# Patient Record
Sex: FEMALE | NOT HISPANIC OR LATINO | Employment: OTHER
[De-identification: names, ages, dates, MRNs, and addresses within clinical notes are randomized per-mention and may not be internally consistent; named-entity substitution may affect disease eponyms.]

---

## 2022-08-16 ENCOUNTER — EXTERNAL RECORD (OUTPATIENT)
Dept: HEALTH INFORMATION MANAGEMENT | Facility: OTHER | Age: 67
End: 2022-08-16

## 2022-12-14 ENCOUNTER — OFFICE VISIT (OUTPATIENT)
Dept: GASTROENTEROLOGY | Age: 67
End: 2022-12-14

## 2022-12-14 ENCOUNTER — TELEPHONE (OUTPATIENT)
Dept: GASTROENTEROLOGY | Age: 67
End: 2022-12-14

## 2022-12-14 VITALS
BODY MASS INDEX: 28.92 KG/M2 | HEART RATE: 97 BPM | WEIGHT: 169.4 LBS | HEIGHT: 64 IN | SYSTOLIC BLOOD PRESSURE: 108 MMHG | DIASTOLIC BLOOD PRESSURE: 70 MMHG

## 2022-12-14 DIAGNOSIS — Z90.410 H/O WHIPPLE PROCEDURE: ICD-10-CM

## 2022-12-14 DIAGNOSIS — Z90.49 H/O WHIPPLE PROCEDURE: ICD-10-CM

## 2022-12-14 DIAGNOSIS — R63.4 WEIGHT LOSS: Primary | ICD-10-CM

## 2022-12-14 PROCEDURE — 99204 OFFICE O/P NEW MOD 45 MIN: CPT | Performed by: INTERNAL MEDICINE

## 2022-12-14 RX ORDER — LORATADINE 10 MG/1
10 TABLET ORAL DAILY PRN
COMMUNITY

## 2022-12-14 RX ORDER — LEVOCETIRIZINE DIHYDROCHLORIDE 5 MG/1
TABLET, FILM COATED ORAL
COMMUNITY
Start: 2022-12-13

## 2022-12-14 RX ORDER — ATORVASTATIN CALCIUM 80 MG/1
TABLET, FILM COATED ORAL
COMMUNITY

## 2022-12-14 RX ORDER — FAMOTIDINE 20 MG/1
TABLET, FILM COATED ORAL
COMMUNITY

## 2022-12-14 RX ORDER — PANTOPRAZOLE SODIUM 40 MG/1
TABLET, DELAYED RELEASE ORAL
COMMUNITY
Start: 2022-12-13

## 2022-12-14 RX ORDER — CHLORAL HYDRATE 500 MG
1000 CAPSULE ORAL
COMMUNITY

## 2022-12-14 RX ORDER — GABAPENTIN 300 MG/1
CAPSULE ORAL
COMMUNITY

## 2022-12-14 RX ORDER — CITALOPRAM 20 MG/1
TABLET ORAL
COMMUNITY
Start: 2022-12-12

## 2022-12-14 RX ORDER — CLOPIDOGREL BISULFATE 75 MG/1
TABLET ORAL
COMMUNITY
Start: 2022-11-10

## 2022-12-14 RX ORDER — ALBUTEROL SULFATE 90 UG/1
1 AEROSOL, METERED RESPIRATORY (INHALATION)
COMMUNITY

## 2022-12-14 RX ORDER — MONTELUKAST SODIUM 10 MG/1
TABLET ORAL
COMMUNITY

## 2022-12-14 RX ORDER — ATORVASTATIN CALCIUM 80 MG/1
TABLET, FILM COATED ORAL
COMMUNITY
Start: 2022-12-12

## 2022-12-14 RX ORDER — EZETIMIBE 10 MG/1
TABLET ORAL
COMMUNITY
Start: 2022-12-13

## 2022-12-14 RX ORDER — POTASSIUM CHLORIDE 20 MEQ/1
TABLET, EXTENDED RELEASE ORAL
COMMUNITY
Start: 2022-12-12

## 2022-12-14 RX ORDER — FLUTICASONE FUROATE, UMECLIDINIUM BROMIDE AND VILANTEROL TRIFENATATE 200; 62.5; 25 UG/1; UG/1; UG/1
POWDER RESPIRATORY (INHALATION)
COMMUNITY
Start: 2022-09-16

## 2022-12-14 RX ORDER — MODAFINIL 200 MG/1
1.5 TABLET ORAL
COMMUNITY

## 2022-12-14 RX ORDER — FERROUS SULFATE 325(65) MG
TABLET ORAL
COMMUNITY

## 2022-12-14 RX ORDER — ARMODAFINIL 250 MG/1
TABLET ORAL
COMMUNITY
Start: 2022-11-04

## 2022-12-14 RX ORDER — INSULIN GLARGINE 100 [IU]/ML
INJECTION, SOLUTION SUBCUTANEOUS
COMMUNITY
Start: 2022-09-30

## 2022-12-14 RX ORDER — TIOTROPIUM BROMIDE 18 UG/1
CAPSULE ORAL; RESPIRATORY (INHALATION)
COMMUNITY

## 2022-12-14 ASSESSMENT — ENCOUNTER SYMPTOMS
WEAKNESS: 0
SORE THROAT: 0
SHORTNESS OF BREATH: 0
CONSTIPATION: 0
ABDOMINAL PAIN: 0
COUGH: 0
CHILLS: 0
EYE PAIN: 0
FEVER: 0
NAUSEA: 0
BLOOD IN STOOL: 0
WEIGHT LOSS: 1
HALLUCINATIONS: 0
DIARRHEA: 0
HEARTBURN: 0
HEADACHES: 0
DIZZINESS: 0
VOMITING: 0
MEMORY LOSS: 0
BACK PAIN: 0

## 2022-12-28 ENCOUNTER — HOSPITAL ENCOUNTER (OUTPATIENT)
Dept: GENERAL RADIOLOGY | Age: 67
End: 2022-12-28
Attending: STUDENT IN AN ORGANIZED HEALTH CARE EDUCATION/TRAINING PROGRAM

## 2023-03-24 ENCOUNTER — OFFICE VISIT (OUTPATIENT)
Dept: PODIATRY CLINIC | Facility: CLINIC | Age: 68
End: 2023-03-24
Payer: MEDICARE

## 2023-03-24 DIAGNOSIS — M79.675 GREAT TOE PAIN, LEFT: ICD-10-CM

## 2023-03-24 DIAGNOSIS — E11.42 TYPE 2 DIABETES MELLITUS WITH POLYNEUROPATHY (HCC): ICD-10-CM

## 2023-03-24 DIAGNOSIS — M79.674 GREAT TOE PAIN, RIGHT: ICD-10-CM

## 2023-03-24 DIAGNOSIS — I73.9 PERIPHERAL VASCULAR DISEASE (HCC): ICD-10-CM

## 2023-03-24 DIAGNOSIS — L60.0 INGROWN TOENAIL OF BOTH FEET: Primary | ICD-10-CM

## 2023-03-24 PROCEDURE — 1125F AMNT PAIN NOTED PAIN PRSNT: CPT | Performed by: PODIATRIST

## 2023-03-24 PROCEDURE — 99203 OFFICE O/P NEW LOW 30 MIN: CPT | Performed by: PODIATRIST

## 2023-03-24 RX ORDER — CITALOPRAM 20 MG/1
TABLET ORAL
COMMUNITY
Start: 2022-12-12

## 2023-03-24 RX ORDER — LEVOCETIRIZINE DIHYDROCHLORIDE 5 MG/1
TABLET, FILM COATED ORAL
COMMUNITY
Start: 2022-12-13

## 2023-03-24 RX ORDER — ASPIRIN 81 MG/1
81 TABLET ORAL DAILY
COMMUNITY

## 2023-03-24 RX ORDER — CLOPIDOGREL BISULFATE 75 MG/1
TABLET ORAL
COMMUNITY
Start: 2023-02-24

## 2023-03-24 RX ORDER — ARMODAFINIL 250 MG/1
TABLET ORAL
COMMUNITY
Start: 2022-11-04

## 2023-03-24 RX ORDER — PANTOPRAZOLE SODIUM 40 MG/1
TABLET, DELAYED RELEASE ORAL
COMMUNITY
Start: 2023-02-24

## 2023-03-24 RX ORDER — SITAGLIPTIN 100 MG/1
TABLET, FILM COATED ORAL
COMMUNITY
Start: 2023-02-24

## 2023-03-24 RX ORDER — ISOPROPYL ALCOHOL 70 ML/100ML
SWAB TOPICAL
COMMUNITY
Start: 2023-02-28

## 2023-03-24 RX ORDER — FAMOTIDINE 20 MG/1
TABLET, FILM COATED ORAL
COMMUNITY

## 2023-03-24 RX ORDER — MAGNESIUM OXIDE 400 MG (241.3 MG MAGNESIUM) TABLET
1 TABLET AS DIRECTED
COMMUNITY
Start: 2021-12-07

## 2023-03-24 RX ORDER — CALCIUM CITRATE/VITAMIN D3 200MG-6.25
TABLET ORAL
COMMUNITY
Start: 2023-02-23

## 2023-03-24 RX ORDER — LIDOCAINE HYDROCHLORIDE 20 MG/ML
SOLUTION ORAL; TOPICAL
COMMUNITY
Start: 2023-02-23

## 2023-03-24 RX ORDER — ATORVASTATIN CALCIUM 80 MG/1
TABLET, FILM COATED ORAL
COMMUNITY
Start: 2022-12-12

## 2023-03-24 RX ORDER — ASCORBIC ACID 500 MG
500 TABLET ORAL DAILY
COMMUNITY

## 2023-03-24 RX ORDER — INSULIN GLARGINE 100 [IU]/ML
INJECTION, SOLUTION SUBCUTANEOUS
COMMUNITY
Start: 2023-02-23

## 2023-03-24 RX ORDER — POTASSIUM CHLORIDE 20 MEQ/1
TABLET, EXTENDED RELEASE ORAL
COMMUNITY
Start: 2023-02-24

## 2023-03-24 RX ORDER — EZETIMIBE 10 MG/1
10 TABLET ORAL NIGHTLY
COMMUNITY

## 2023-03-24 RX ORDER — CHLORAL HYDRATE 500 MG
1000 CAPSULE ORAL AS DIRECTED
COMMUNITY

## 2023-03-24 RX ORDER — GABAPENTIN 300 MG/1
CAPSULE ORAL
COMMUNITY
Start: 2023-02-24

## 2023-03-24 RX ORDER — FLUTICASONE PROPIONATE AND SALMETEROL 500; 50 UG/1; UG/1
POWDER RESPIRATORY (INHALATION)
COMMUNITY

## 2023-04-10 ENCOUNTER — HOSPITAL ENCOUNTER (OUTPATIENT)
Dept: ULTRASOUND IMAGING | Facility: HOSPITAL | Age: 68
Discharge: HOME | End: 2023-04-10
Attending: PODIATRIST
Payer: MEDICARE

## 2023-04-10 DIAGNOSIS — M79.675 GREAT TOE PAIN, LEFT: ICD-10-CM

## 2023-04-10 DIAGNOSIS — M79.674 GREAT TOE PAIN, RIGHT: ICD-10-CM

## 2023-04-10 DIAGNOSIS — E11.42 TYPE 2 DIABETES MELLITUS WITH POLYNEUROPATHY (HCC): ICD-10-CM

## 2023-04-10 DIAGNOSIS — I73.9 PERIPHERAL VASCULAR DISEASE (HCC): ICD-10-CM

## 2023-04-10 PROCEDURE — 93923 UPR/LXTR ART STDY 3+ LVLS: CPT | Performed by: PODIATRIST

## 2025-05-16 NOTE — DISCHARGE INSTRUCTIONS
Follow-up with your primary care provider in 2 to 3 days.  Start the antibiotic as prescribed finish it completely you will be notified of the urine culture results drink plenty of fluids recommend over-the-counter Coricidin HBP for cough and congestion this medication is safe when you have underlying high blood pressure or heart issues.  You may give over-the-counter Tylenol for any body aches chills pain or discomfort if she develops a fever worsening cough chest pain or shortness of breath vomiting diarrhea back pain abdominal pain or any new or worsening symptoms go to the nearest emergency department

## 2025-05-16 NOTE — ED PROVIDER NOTES
Patient Seen in: [facility]      History     Chief Complaint   Patient presents with    Cough    Urinary Symptoms     Stated Complaint: Cough; Odoe in urine    Subjective:   HPI  History of Present Illness            This is a 70-year-old female who lives at the [facility] home with a history of CVA A-fib on Xarelto COPD PVD hypertension type 2 diabetes orthostatic hypotension GERD sleep apnea lung cancer neuropathy depression respiratory failure who is on 3 L home O2 and wears a CPAP machine presenting for a wet cough and odor to the urine.  Patient is accompanied with a nurse from the facility where she resides Bri the nurse at bedside states that for few days she has had a wet cough and her urine smells really bad so her primary care doctor wanted her to be evaluated for chest x-ray urine sample and a urine culture to be sent out.  Denies any runny nose fever congestion vomiting diarrhea abdominal pain back pain denies any urinary symptoms such as frequency urgency or burning.  Baseline O2 is 90-93% on 3L oxygen per nurse at bedside.      Objective:     Past Medical History:    Allergies    Asthma (HCC)    COPD (chronic obstructive pulmonary disease) (HCC)    Diabetes (HCC)    History of DVT (deep vein thrombosis)    Hyperlipidemia    Narcolepsy (HCC)              Past Surgical History:   Procedure Laterality Date          Cholecystectomy      Lumbar spine surgery      Tonsillectomy                  Social History     Socioeconomic History    Marital status:    Tobacco Use    Smoking status: Every Day     Current packs/day: 1.00     Average packs/day: 1 pack/day for 40.0 years (40.0 ttl pk-yrs)     Types: Cigarettes    Smokeless tobacco: Never   Vaping Use    Vaping status: Never Used   Substance and Sexual Activity    Alcohol use: Never    Drug use: Never     Social Drivers of Health      Received from [facility]    Housing Stability               Review of Systems    Positive for stated complaint: Cough; Odoe in urine  Other systems are as noted in HPI.  Constitutional and vital signs reviewed.      All other systems reviewed and negative except as noted above.                  Physical Exam     ED Triage Vitals [05/16/25 1603]   /44   Pulse 89   Resp 18   Temp 97.4 °F (36.3 °C)   Temp src Oral   SpO2 92 %   O2 Device Nasal cannula       Current Vitals:   Vital Signs  BP: 109/44  Pulse: 89  Resp: 18  Temp: 97.4 °F (36.3 °C)  Temp src: Oral    Oxygen Therapy  SpO2: 92 %  O2 Device: None (Room air)  O2 Flow Rate (L/min): 3 L/min          Physical Exam  Vitals and nursing note reviewed.   Constitutional:       Appearance: Normal appearance.   HENT:      Right Ear: Tympanic membrane normal.      Left Ear: Tympanic membrane normal.      Nose: Nose normal. No congestion or rhinorrhea.      Mouth/Throat:      Mouth: Mucous membranes are moist.      Pharynx: Oropharynx is clear. No posterior oropharyngeal erythema.   Eyes:      Conjunctiva/sclera: Conjunctivae normal.   Cardiovascular:      Rate and Rhythm: Normal rate.   Pulmonary:      Effort: Pulmonary effort is normal. No respiratory distress.      Breath sounds: Normal breath sounds. No wheezing.      Comments: + wearing 3L home O2  + wet cough  Abdominal:      Palpations: Abdomen is soft.      Tenderness: There is no abdominal tenderness. There is no right CVA tenderness or left CVA tenderness.   Musculoskeletal:         General: Normal range of motion.      Cervical back: Normal range of motion. No rigidity or tenderness.   Lymphadenopathy:      Cervical: No cervical adenopathy.   Skin:     General: Skin is warm.      Capillary Refill: Capillary refill takes less than 2 seconds.   Neurological:      General: No focal deficit present.      Mental Status: She is alert and oriented to person, place, and time.   Psychiatric:         Mood and Affect: Mood normal.                 ED Course     Labs  Reviewed   [facility] POCT URINALYSIS DIPSTICK - Abnormal; Notable for the following components:       Result Value    Urine Clarity Turbid (*)     Protein urine 100 (*)     Glucose, Urine 500 (*)     Ketone, Urine 15 (*)     Bilirubin, Urine Small (*)     Leukocyte esterase urine Small (*)     All other components within normal limits   POCT GLUCOSE - Abnormal; Notable for the following components:    POC Glucose  388 (*)     All other components within normal limits   POCT ISTAT CHEM8 CARTRIDGE - Abnormal; Notable for the following components:    ISTAT BUN 21 (*)     ISTAT Potassium 3.3 (*)     ISTAT Chloride 95 (*)     ISTAT Glucose 397 (*)     All other components within normal limits   URINE CULTURE, ROUTINE                            MDM         Medical Decision Making  70-year-old female nontoxic-appearing afebrile oxygen at baseline per the nurse at bedside with patient presenting with a wet cough for few days and a bad odor to the urine.  DDx dysuria versus UTI versus interstitial cystitis versus dehydration versus URI versus COPD exacerbation versus pneumonia no clinical indication for labs but a urine dip culture and chest x-ray will be ordered.    Urine dip as resulted above glucose ketones and leukocytes concerning for UTI urine culture will be sent out and anticipate treatment with Keflex chest x-ray independently viewed by this provider no pneumonia Accu-Chek was obtained glucose is 388 after discussing this with my attending Dr. Gutierrez a chemistry will be obtained to evaluate patient's kidney function.  This was discussed with the patient and nurse at bedside from the facility with the patient and they are agreeable with this plan of care.    Chemistry is resulted above noting a glucose of 397 potassium is 3.3 slightly low BUN is 21 GFR is normal.  Discussed these results with patient and nurse at bedside discussed close follow-up with primary care provider for the labs discussed glucose level is likely  elevated due to the UTI that she will be treated for and possibly underlying viral illness such as a URI as she does have a cough.  Discussed sending Keflex to the pharmacy for the urine infection and sending out a urine culture discussed encouraging her to drink water and urinating whenever she has the urge.  Discussed strict ER precautions with any new or worsening symptoms or concerns.  Patient and nurse at bedside are agreeable with this plan of care and acknowledged understanding discharge instructions.    Problems Addressed:  Bad odor of urine: acute illness or injury  Urinary tract infection without hematuria, site unspecified: acute illness or injury  Viral upper respiratory tract infection: acute illness or injury    Amount and/or Complexity of Data Reviewed  Labs: ordered. Decision-making details documented in ED Course.  Radiology: ordered and independent interpretation performed. Decision-making details documented in ED Course.  Discussion of management or test interpretation with external provider(s): This patient was discussed with my attending Dr. Gutiererz who agrees with this provider's management and plan of care.    Risk  OTC drugs.  Prescription drug management.          Disposition and Plan     Clinical Impression:  1. Viral upper respiratory tract infection    2. Bad odor of urine    3. Urinary tract infection without hematuria, site unspecified         Disposition:  Discharge  5/16/2025  5:23 pm    Follow-up:    Follow-up with your primary care doctor in 2 to 3 days  Schedule an appointment as soon as possible for a visit in 2 days            Medications Prescribed:  Discharge Medication List as of 5/16/2025  5:24 PM        START taking these medications    Details   cephALEXin 500 MG Oral Cap Take 1 capsule (500 mg total) by mouth 2 (two) times daily for 7 days., Normal, Disp-14 capsule, R-0                   Supplementary Documentation: